# Patient Record
Sex: FEMALE | Race: WHITE
[De-identification: names, ages, dates, MRNs, and addresses within clinical notes are randomized per-mention and may not be internally consistent; named-entity substitution may affect disease eponyms.]

---

## 2017-03-08 ENCOUNTER — HOSPITAL ENCOUNTER (OUTPATIENT)
Dept: HOSPITAL 47 - RADUSWWP | Age: 47
Discharge: HOME | End: 2017-03-08
Payer: COMMERCIAL

## 2017-03-08 DIAGNOSIS — K76.0: ICD-10-CM

## 2017-03-08 DIAGNOSIS — K76.89: Primary | ICD-10-CM

## 2017-03-08 PROCEDURE — 76700 US EXAM ABDOM COMPLETE: CPT

## 2017-03-08 NOTE — US
EXAMINATION TYPE: US abdomen complete

 

DATE OF EXAM: 3/8/2017 1:51 PM

 

COMPARISON: US in PACS

 

CLINICAL HISTORY: Gen Abd Pain R10.84. Pt states ABD pain

 

EXAM MEASUREMENTS:

 

Liver Length:  18.5 cm   

Gallbladder Wall:  0.3 cm   

CBD:  0.6 cm

Spleen:  11.8 cm   

Right Kidney:  10.0 x 3.8 x 4.6 cm 

Left Kidney:  9.7 x 4.7 x 4.5 cm   

 

TECHNOLOGIST IMPRESSION:

 

Pancreas:  wnl, tail obscured by overlying bowel gas

Liver:  Enlarged, heterogeneous, difficult to penetrate/ Cyst left medial lobe=2.2 x 2.0 x 2.0 cm, un
changed from previous   

Gallbladder:  Possible small amount of sludge at dependent portion as seen on previous/ Fold at fundu
s

**Evidence for sonographic Fontenot's sign:  No

CBD:  wnl 

Spleen:  wnl   

Right Kidney:  wnl   

Left Kidney:  wnl   

Upper IVC:  wnl  

Abd Aorta:  wnl

 

IMPRESSION: 

1. Small amount of sludge may be present within the gallbladder.

2. Fatty infiltration liver. A cyst is within the left lobe, stable.

## 2017-03-17 ENCOUNTER — HOSPITAL ENCOUNTER (OUTPATIENT)
Dept: HOSPITAL 47 - ORWHC2ENDO | Age: 47
Discharge: HOME | End: 2017-03-17
Payer: COMMERCIAL

## 2017-03-17 VITALS — TEMPERATURE: 98.1 F

## 2017-03-17 VITALS — HEART RATE: 72 BPM | DIASTOLIC BLOOD PRESSURE: 81 MMHG | SYSTOLIC BLOOD PRESSURE: 123 MMHG | RESPIRATION RATE: 18 BRPM

## 2017-03-17 VITALS — BODY MASS INDEX: 30.7 KG/M2

## 2017-03-17 DIAGNOSIS — Z79.899: ICD-10-CM

## 2017-03-17 DIAGNOSIS — F41.9: ICD-10-CM

## 2017-03-17 DIAGNOSIS — Z88.0: ICD-10-CM

## 2017-03-17 DIAGNOSIS — Z88.2: ICD-10-CM

## 2017-03-17 DIAGNOSIS — K29.50: ICD-10-CM

## 2017-03-17 DIAGNOSIS — K21.9: Primary | ICD-10-CM

## 2017-03-17 PROCEDURE — 88342 IMHCHEM/IMCYTCHM 1ST ANTB: CPT

## 2017-03-17 PROCEDURE — 88305 TISSUE EXAM BY PATHOLOGIST: CPT

## 2017-03-17 PROCEDURE — 45380 COLONOSCOPY AND BIOPSY: CPT

## 2017-03-17 PROCEDURE — 43239 EGD BIOPSY SINGLE/MULTIPLE: CPT

## 2017-03-17 NOTE — P.PCN
Date of Procedure: 03/17/17


Procedure(s) Performed: 


Brief history:


Patient is a pleasant 46-year-old white female, scheduled for an elective upper 

endoscopy as well as colonoscopy as a part of evaluation of intermittent 

dysphagia to solids and change in bowel habits.  She also has long-standing 

history of GERD and has been on Prilosec 20 mg daily for several years.  She 

has been having diarrhea with bowel movements anywhere from 10-12 day which are 

loose to watery in consistency but no blood or mucus in the stool.





Procedure performed:


Esophagogastroduodenoscopy with biopsy


Colonoscopy with biopsy





Preoperative diagnosis:


Dysphagia/GERD


Change in bowel habits and chronic diarrhea for months duration





Anesthesia: MAC





Procedure:


After informed consent was obtained from the patient  was brought into the 

endoscopy unit and IV conscious sedation was administered by anesthesia under 

continuous monitoring.  Initially upper endoscopy was done.  The Olympus  

video endoscope was inserted inserted into the mouth and esophagus intubated 

without any difficulty and was gradually advanced into the stomach and duodenum 

and carefully examined.  The bulb and second part of the duodenum appeared 

normal.  Biopsies were done from this area to rule out celiac disease.  The 

scope was then withdrawn into the stomach adequately insufflated with air and 

upon careful examination  the antrum had mild gastritis and biopsies were done 

from this area.  The  body, cardia and fundus appeared normal.  The scope was 

then withdrawn into the esophagus.  The GE junction was located at 40 cm to the 

incisors.  It appeared regular with no erythema erosions or ulcerations.  Rest 

of the esophagus appeared normal.  Patient tolerated the procedure well.





At this time the patient continued to remain sedation.  Initial digital rectal 

examination was normal.  Olympus  video colonoscope was then inserted 

into the rectum and gradually advanced to the cecum without any difficulty.  

Careful examination was performed as the scope was gradually being withdrawn.  

The prep was excellent.  terminal ileum was intubated and 20 cm visualized and 

appeared normal. The cecum, ascending colon, transverse colon, descending colon

, sigmoid colon and rectum appeared normal.  random biopsies were done from 

ascending and descending colon to rule out microscopic/collagenous colitis. 

Retroflexion was performed in the rectum and no lesions were noted.  Patient 

tolerated the procedure well.





Impression:


1.  Upper endoscopy revealed mild antral gastritis but no evidence of 

esophagitis or peptic ulcer disease 


2.  Colonoscopy revealed normal-appearing colon from rectum to cecum with no 

evidence of colitis or colorectal neoplasia.  








Recommendations:


Findings of this examination were discussed with the patient as well as[her 

family.  She was advised to follow with the biopsy results.  She can have a 

repeat colonoscopy in 10 years.

## 2017-11-08 ENCOUNTER — HOSPITAL ENCOUNTER (OUTPATIENT)
Dept: HOSPITAL 47 - RADMAMWWP | Age: 47
Discharge: HOME | End: 2017-11-08
Payer: COMMERCIAL

## 2017-11-08 DIAGNOSIS — Z12.31: Primary | ICD-10-CM

## 2017-11-08 DIAGNOSIS — Z78.0: ICD-10-CM

## 2017-11-08 PROCEDURE — 77080 DXA BONE DENSITY AXIAL: CPT

## 2017-11-08 NOTE — BD
EXAMINATION TYPE: MG DEXA axial skeleton.  

 

DATE OF EXAM: 11/8/2017

 

COMPARISON: NONE

 

CLINICAL HISTORY: Postmenopausal female. Screening.

 

Height:  5'5

Weight:  200

 

FRAX RISK QUESTIONS:

Alcohol (3 or more units per day):  no

Family History (Parent hip fracture):  no

Glucocorticoids (More than 3mos):  no

           (Ex: prednisone, prednisolone, methylprednisolone, dexamethasone, and hydrocortisone).    
     

History of Fracture in Adulthood: no

Secondary Osteoporosis:

  1.  Type 1 Diabetes: no

  2.  Hyperthyroidism: no

  3.  Menopause before 45: yes

  4.  Malnutrition: no

  5.  Chronic liver disease: no

Rheumatoid Arthritis: no

Current Tobacco Use: no

 

RISK FACTORS 

HISTORY OF: 

Family History of Osteoporosis:

Active:

Diet low in dairy products/other sources of calcium:

Postmenopausal woman:

 

MEDICATIONS: 

Additional Medications: anxiety,  

 

 

Additional History:

 

 

EXAM MEASUREMENTS: 

Bone mineral densitometry was performed using the [employer] System.

Bone mineral density as measured about the Lumbar spine is:  

----- L1-L4(G/cm2): 1.343

T Score Values are as follows:

----- L2: 1.1

----- L3: 1.6

----- L4: 1.4

----- L1-L4:1.4

 

Bone mineral density about the R hip (g/cm2): 1.137

Bone mineral density about the L hip (g/cm2): 1.149

T Score values are as follows:

-----R Neck: 0.7

-----L Neck: 0.8

-----R Total: 0.9

-----L Total: 1.0

 

IMPRESSION:

Normal (Values between +1 and -1 indicate normal bone mass).  Consider repeating this study in 5 year
s or sooner if there is some new clinical indication.

 

 

 

 

 

NOTE:  T-SCORE=SD OF THE YOUNG ADULT MEAN.

## 2017-11-09 NOTE — MM
Reason for exam: screening  (asymptomatic).

Last mammogram was performed 2 years and 4 months ago.



History:

Patient is postmenopausal and is nulliparous.

Family history of breast cancer in grandmother.

Took hormonal contraceptives for 10 years.



Physical Findings:

A clinical breast exam by your physician is recommended on an annual basis and 

results should be correlated with mammographic findings.



MG Screening Mammo w CAD

Bilateral CC and MLO view(s) were taken.

Prior study comparison: July 9, 2015, bilateral MG screening mammo w CAD.  June 17, 2014, bilateral MG screening mammo w CAD.

There are scattered fibroglandular densities.  Developing asymmetry anterior upper

outer quadrant in the left breast. Focal asymmetry left posterior middle central 

aspect on MLO view.

This finding is changed when compared with previous exams.





ASSESSMENT: Incomplete: need additional imaging evaluation, BI-RAD 0



RECOMMENDATION:

Special view mammogram of the left breast.



If lesion persists on supplemental views, image directed ultrasound is 

recommended.



[facility] will attempt to contact patient to return for supplemental 

views and ultrasound if indicated.

## 2017-11-14 ENCOUNTER — HOSPITAL ENCOUNTER (OUTPATIENT)
Dept: HOSPITAL 47 - RADMAMWWP | Age: 47
Discharge: HOME | End: 2017-11-14
Payer: COMMERCIAL

## 2017-11-14 DIAGNOSIS — R92.8: Primary | ICD-10-CM

## 2017-11-14 DIAGNOSIS — Z80.3: ICD-10-CM

## 2017-11-14 PROCEDURE — 76642 ULTRASOUND BREAST LIMITED: CPT

## 2017-11-14 NOTE — USB
Reason for exam: additional evaluation requested from abnormal screening.



History:

Patient is postmenopausal and is nulliparous.

Family history of breast cancer in grandmother.

Took hormonal contraceptives for 10 years.



US Breast Workup Limited LT

Left breast ultrasound demonstrates a 0.4 x 0.3 x 0.4cm oval lesion too small to 

characterize at 1 o'clock, a 0.7 x 0.6 x 0.6cm irregular, hypoechoic, vascular 

lesion at 2 o'clock, 6cm from nipple, which biopsy advised, believed to correspond

to mammographic area, a 0.8 x 0.4 x 0.8cm oblong, lobular, cystic lesion at 4 

o'clock and a 0.8 x 0.6 x 0.7cm cluster at 4 o'clock.



These results were verbally communicated with the patient and result sheet given 

to the patient on 11/14/17.





ASSESSMENT: Suspicious, BI-RAD 4



RECOMMENDATION:

Ultrasound core biopsy of the left breast.



Called Dr. Hudson with mammographic findings and has scheduled an appointment for

the patient for 12/7/17 at 10:40 with Dr. Isaac.

Biopsy scheduled for 11/17/17 at 11:30.





PRELIMINARY REPORT CALLED AND FAXED TO DR. ISAAC ON 11/14/17.

## 2017-11-14 NOTE — MM
Reason for exam: additional evaluation requested from abnormal screening.

Last mammogram was performed less than 1 month ago.



History:

Patient is postmenopausal and is nulliparous.

Family history of breast cancer in grandmother.

Took hormonal contraceptives for 10 years.



Physical Findings:

Nurse did not find any significant physical abnormalities on exam.



MG Work Up Mamm w CAD LT

Spot compression CC, spot compression MLO, and ML view(s) were taken of the left 

breast.

Prior study comparison: November 8, 2017, bilateral MG screening mammo w CAD.  

July 9, 2015, bilateral MG screening mammo w CAD.

Area does not completely go away.



These results were verbally communicated with the patient and result sheet given 

to the patient on 11/14/17.





ASSESSMENT: Incomplete: need additional imaging evaluation, BI-RAD 0



RECOMMENDATION:

Ultrasound of the left breast.

## 2017-11-17 ENCOUNTER — HOSPITAL ENCOUNTER (OUTPATIENT)
Dept: HOSPITAL 47 - RADUSWWP | Age: 47
End: 2017-11-17
Payer: COMMERCIAL

## 2017-11-17 VITALS — BODY MASS INDEX: 33.3 KG/M2

## 2017-11-17 VITALS
TEMPERATURE: 98 F | RESPIRATION RATE: 16 BRPM | DIASTOLIC BLOOD PRESSURE: 74 MMHG | HEART RATE: 74 BPM | SYSTOLIC BLOOD PRESSURE: 142 MMHG

## 2017-11-17 DIAGNOSIS — N60.22: ICD-10-CM

## 2017-11-17 DIAGNOSIS — R92.8: ICD-10-CM

## 2017-11-17 DIAGNOSIS — N60.32: Primary | ICD-10-CM

## 2017-11-17 PROCEDURE — 19083 BX BREAST 1ST LESION US IMAG: CPT

## 2017-11-17 PROCEDURE — 88305 TISSUE EXAM BY PATHOLOGIST: CPT

## 2017-11-17 NOTE — USB
ULTRASOUND GUIDED CORE BIOPSY LEFT BREAST BIOPSY:

 

CLINICAL HISTORY: Abnormal ultrasound and mammogram

 

FINDINGS: 

The procedure was explained to the patient.  The risks, complications, benefits and alternatives were
 discussed and any questions were answered.  Informed consent was obtained.  Patient was placed supin
e on the ultrasound table and prepped and draped in the usual sterile fashion.  Utilizing a 14 gauge 
needle, 3 passes were made into the requested lesion.  

 

Patient was stable throughout the procedure.  Pathology is pending.

 

All elements of maximal barrier and sterile technique were utilized. Surgical clip was placed and the
 mammogram demonstrated the area of concern to be in the region of the clip.

 

IMPRESSION: 

1.  Successful ultrasound guided core biopsy left breast. Pathology pending. If the pathology is disc
ordant with the radiology than a stereotactic core biopsy or needle localization would be recommended
.

## 2017-11-17 NOTE — MM
Reason for exam: additional evaluation requested from abnormal screening.

Last mammogram was performed less than 1 month ago.



History:

Patient is postmenopausal and is nulliparous.

Family history of breast cancer in grandmother.

Took hormonal contraceptives for 10 years.



MG Diagnostic Mammo LT Wo CAD

CC and ML view(s) were taken of the left breast.

Prior study comparison: November 14, 2017, left breast MG work up mamm w CAD LT.  

November 8, 2017, bilateral MG screening mammo w CAD.



ASSESSMENT: Post procedure mammogram for marker placement



RECOMMENDATION:

Ultrasound of the left breast in 6 months.

PENDING PATHOLOGY RESULTS.

## 2018-06-08 ENCOUNTER — HOSPITAL ENCOUNTER (OUTPATIENT)
Dept: HOSPITAL 47 - RADMAMWWP | Age: 48
Discharge: HOME | End: 2018-06-08
Payer: COMMERCIAL

## 2018-06-08 DIAGNOSIS — R92.8: Primary | ICD-10-CM

## 2018-06-08 PROCEDURE — 77065 DX MAMMO INCL CAD UNI: CPT

## 2018-06-11 NOTE — MM
Reason for exam: follow-up at short interval from prior study.

Last mammogram was performed 7 months ago.



History:

Patient is postmenopausal and is nulliparous.

Family history of breast cancer in grandmother.

Benign US breast needle core LT of the left breast, November 17, 2017.

Took hormonal contraceptives for 10 years.



Physical Findings:

Nurse Summary: 1cm nodule in the left breast at 12 o'clock and 3 o'clock (nurse 

dw).



MG Diagnostic Mammo LT w CAD

CC and MLO view(s) were taken of the left breast.

Prior study comparison: November 17, 2017, left breast MG diagnostic mammo LT wo 

CAD.  November 14, 2017, left breast MG work up mamm w CAD LT.

The breast tissue is heterogeneously dense. This may lower the sensitivity of 

mammography.  There are benign appearing round, oval, circumscribed masses 

corresponding to sonographic cysts. Left biopsy marker noted.



These results were verbally communicated with the patient and result sheet given 

to the patient on 6/8/18.





ASSESSMENT: Benign, BI-RAD 2



RECOMMENDATION:

Return to routine screening mammogram schedule for both breasts.

Back on schedule for November 2018.

## 2018-06-11 NOTE — USB
Reason for exam: follow-up at short interval from prior study.



History:

Patient is postmenopausal and is nulliparous.

Family history of breast cancer in grandmother.

Benign US breast needle core LT of the left breast, November 17, 2017.

Took hormonal contraceptives for 10 years.



US Breast LT

Left complete breast ultrasound includes all four quadrants, the retroareolar 

region and axilla. Finding demonstrates a 0.3 x 0.4 x 0.3cm hypoechoic lesion at 1

o'clock, likely complicated cyst, increased through transmission, 4 x 3 x 4mm on 

prior, a 0.4 x 0.4 x 0.3cm cystic lesion at 3 o'clock, 4 x 3cm on prior, a 1.0 x 

1.1 x 0.5cm oval, vascular lesion at 3 o'clock and a 0.7 x 0.4 x 0.3cm cystic, 

benign lesion at 5 o'clock.



These results were verbally communicated with the patient and result sheet given 

to the patient on 6/8/18.





ASSESSMENT: Benign, BI-RAD 2



RECOMMENDATION:

Return to routine screening mammogram schedule for both breasts.

Back on schedule for November 2018.

## 2019-04-01 ENCOUNTER — HOSPITAL ENCOUNTER (OUTPATIENT)
Dept: HOSPITAL 47 - RADMAMWWP | Age: 49
Discharge: HOME | End: 2019-04-01
Attending: INTERNAL MEDICINE
Payer: COMMERCIAL

## 2019-04-01 DIAGNOSIS — Z12.31: Primary | ICD-10-CM

## 2019-04-01 PROCEDURE — 77063 BREAST TOMOSYNTHESIS BI: CPT

## 2019-04-01 PROCEDURE — 77067 SCR MAMMO BI INCL CAD: CPT

## 2019-04-03 NOTE — MM
Reason for exam: screening  (asymptomatic).

Last mammogram was performed 10 months ago.



History:

Patient is postmenopausal and is nulliparous.

Family history of breast cancer in grandmother.

Benign US breast needle core LT of the left breast, November 17, 2017.

Took hormonal contraceptives for 10 years.



Physical Findings:

A clinical breast exam by your physician is recommended on an annual basis and 

results should be correlated with mammographic findings.



MG 3D Screening Mammo W/Cad

Bilateral CC and MLO view(s) were taken.

Prior study comparison: June 8, 2018, left breast MG diagnostic mammo LT w CAD.  

November 17, 2017, left breast MG diagnostic mammo LT wo CAD.

The breast tissue is heterogeneously dense. This may lower the sensitivity of 

mammography.  Previous mammotome biopsy in the left breast. There is chronic 

nodularity in the left breast laterally.  No significant changes when compared 

with prior studies.





ASSESSMENT: Benign, BI-RAD 2



RECOMMENDATION:

Routine screening mammogram of both breasts in 1 year.

## 2019-07-05 ENCOUNTER — HOSPITAL ENCOUNTER (OUTPATIENT)
Dept: HOSPITAL 47 - RADUSWWP | Age: 49
End: 2019-07-05
Attending: INTERNAL MEDICINE
Payer: COMMERCIAL

## 2019-07-05 DIAGNOSIS — R94.4: Primary | ICD-10-CM

## 2019-07-05 PROCEDURE — 76770 US EXAM ABDO BACK WALL COMP: CPT

## 2019-07-05 NOTE — US
EXAMINATION TYPE: US kidneys/renal and bladder

 

DATE OF EXAM: 7/5/2019

 

COMPARISON: US abd complete

 

CLINICAL HISTORY: Abn kidney function R94.4.

 

EXAM MEASUREMENTS:

 

Right Kidney:  10.2 x 3.5 x 3.9 cm

Left Kidney: 9.9 x 5.5 x 6.3 cm

 

 

Right kidney is hard to see due to body habitus and bowel gas. 

 

Right Kidney: No hydronephrosis or masses seen  

Left Kidney: No hydronephrosis or masses seen  

Bladder: wnl

**Bilateral Jets seen: Yes

 

 

There is no evidence for hydronephrosis at this point in time.  No nephrolithiasis is seen.  No nereida
s are identified.  The urinary bladder is anechoic.  Bilateral ureteral jets are seen.

 

 

 

IMPRESSION:

No definite abnormality seen at this time.

## 2020-10-22 ENCOUNTER — HOSPITAL ENCOUNTER (OUTPATIENT)
Dept: HOSPITAL 47 - RADMAMWWP | Age: 50
Discharge: HOME | End: 2020-10-22
Attending: INTERNAL MEDICINE
Payer: COMMERCIAL

## 2020-10-22 DIAGNOSIS — Z12.31: Primary | ICD-10-CM

## 2020-10-22 PROCEDURE — 77067 SCR MAMMO BI INCL CAD: CPT

## 2020-10-22 PROCEDURE — 77063 BREAST TOMOSYNTHESIS BI: CPT

## 2020-10-26 NOTE — MM
Reason for exam: screening  (asymptomatic).

Last mammogram was performed 1 year and 7 months ago.



History:

Patient is postmenopausal and is nulliparous.

Family history of breast cancer in grandmother.

Benign US breast needle core LT of the left breast, November 17, 2017.

Took hormonal contraceptives for 10 years.



Physical Findings:

A clinical breast exam by your physician is recommended on an annual basis and 

results should be correlated with mammographic findings.



MG 3D Screening Mammo W/Cad

Bilateral CC and MLO view(s) were taken.

Prior study comparison: April 1, 2019, bilateral MG 3d screening mammo w/cad.  

June 8, 2018, left breast MG diagnostic mammo LT w CAD.

There are scattered fibroglandular densities.  No significant changes when 

compared with prior studies.





ASSESSMENT: Benign, BI-RAD 2



RECOMMENDATION:

Routine screening mammogram of both breasts in 1 year.

## 2021-07-12 ENCOUNTER — HOSPITAL ENCOUNTER (OUTPATIENT)
Dept: HOSPITAL 47 - LABWHC1 | Age: 51
Discharge: HOME | End: 2021-07-12
Payer: COMMERCIAL

## 2021-07-12 DIAGNOSIS — L43.9: Primary | ICD-10-CM

## 2021-07-12 DIAGNOSIS — R53.83: ICD-10-CM

## 2021-07-12 PROCEDURE — 80074 ACUTE HEPATITIS PANEL: CPT

## 2021-07-12 PROCEDURE — 36415 COLL VENOUS BLD VENIPUNCTURE: CPT

## 2022-06-01 ENCOUNTER — HOSPITAL ENCOUNTER (OUTPATIENT)
Dept: HOSPITAL 47 - RADMAMWWP | Age: 52
Discharge: HOME | End: 2022-06-01
Attending: INTERNAL MEDICINE
Payer: COMMERCIAL

## 2022-06-01 DIAGNOSIS — Z78.0: ICD-10-CM

## 2022-06-01 DIAGNOSIS — Z80.3: ICD-10-CM

## 2022-06-01 DIAGNOSIS — Z12.31: Primary | ICD-10-CM

## 2022-06-01 PROCEDURE — 77067 SCR MAMMO BI INCL CAD: CPT

## 2022-06-01 PROCEDURE — 77063 BREAST TOMOSYNTHESIS BI: CPT

## 2022-06-01 PROCEDURE — 77080 DXA BONE DENSITY AXIAL: CPT

## 2022-06-02 NOTE — MM
Reason for Exam: Screening  (asymptomatic). 

Last mammogram was performed 1 year(s) and 8 month(s) ago. 





Patient History: 

Menarche at age 14. Patient has no children. Postmenopausal. Patient used Hormonal Contraceptives

for 10 years. 11/17/2017, Benign Core Biopsy on the left side.

Maternal grandmother had breast cancer. 





Risk Values: 

Yesenia 5 year model risk: 1.3%.

NCI Lifetime model risk: 11.6%.





Prior Study Comparison: 

6/8/2018 Left Diagnostic Mammogram, [facility]. 4/1/2019 Bilateral Screening Mammogram, [facility]. 10/22/2020

Bilateral Screening Mammogram, [facility]. 





Tissue Density: 

There are scattered fibroglandular densities.





Findings: 

Analyzed By CAD. 

Microclip upper outer quadrant left breast from prior biopsy.



There is nodularity in the central and lateral aspect of the left breast which appears more defined.

However, evaluation of the previous 3-D images suggest that this nodularity was present previously

as well. Precautions. Six-month follow-up is recommended.



Otherwise, no significant change. 





Overall Assessment: Probably benign, BI-RAD 3





Management: 

Diagnostic Mammogram of the left breast in 6 months.

Six-month follow-up diagnostic left breast mammogram to reassess nodularity which is better seen on

previous 3-D images.



Patient should continue monthly self breast exams.



This exam should not preclude additional follow-up of suspicious palpable abnormalities.



Electronically signed and approved by: Rishabh Luna M.D. Radiologist

## 2022-06-02 NOTE — BD
EXAMINATION TYPE: Axial Bone Density

 

DATE OF EXAM: 6/1/2022

 

COMPARISON: 11/08/2017

 

CLINICAL HISTORY: 52 years year old Female.  ICD-10 CODE: M81.0 Osteoporosis

 

Height:  64 IN

Weight:  215 LBS

 

FRAX RISK QUESTIONS:

Family History (Parent hip fracture):  YES MOTHER

Secondary Osteoporosis:

    3.  Menopause before 45: YES AGE 40

  

RISK FACTORS 

HISTORY OF: 

Family History of Osteoporosis: YES MOTHER

Active: YES

Postmenopausal woman: AGE 40

 

MEDICATIONS: 

Additional Medications: VIT D, PLAQUINAL, GOUT MED, FOLIC ACID,DOXYCYCLINE 

 

 

 

EXAM MEASUREMENTS: 

Bone mineral densitometry was performed using the [employer] System.

Bone mineral density as measured about the Lumbar spine is:  

----- L1-L4(G/cm2): 1.393

T Score Values are as follows:

----- L1: 1.4

----- L2: 1.6

----- L3: 2.1

----- L4: 1.8

----- L1-L4: 1.8

Bone mineral density has: Increased 4.0% since study of: 11/08/2017

 

Bone mineral density about the R hip (g/cm2): 1.177

Bone mineral density about the L hip (g/cm2): 1.180

T Score values are as follows:

-----R Neck: 1.0

-----L Neck: 1.0

-----R Total: 1.2

-----L Total: 1.4

Bone mineral density has: Increased 4.4% since study of: 11/08/2017

 

 

FRAX%s: The graph provided illustrates a 6.2 chance for a major osteoporotic fx and a 0.0 chance for 
the hips probability for fx in 10 years time.

 

IMPRESSION:

Normal (Values between +1 and -1 indicate normal bone mass).  Consider repeating this study in 5 year
s or sooner if there is some new clinical indication.

 

NOTE:  T-SCORE=SD OF THE YOUNG ADULT MEAN.

## 2022-12-07 ENCOUNTER — HOSPITAL ENCOUNTER (OUTPATIENT)
Dept: HOSPITAL 47 - RADMAMWWP | Age: 52
Discharge: HOME | End: 2022-12-07
Attending: OBSTETRICS & GYNECOLOGY
Payer: COMMERCIAL

## 2022-12-07 DIAGNOSIS — Z80.3: ICD-10-CM

## 2022-12-07 DIAGNOSIS — Z98.890: ICD-10-CM

## 2022-12-07 DIAGNOSIS — Z78.0: ICD-10-CM

## 2022-12-07 DIAGNOSIS — R92.8: Primary | ICD-10-CM

## 2022-12-07 PROCEDURE — 77061 BREAST TOMOSYNTHESIS UNI: CPT

## 2022-12-07 PROCEDURE — 77065 DX MAMMO INCL CAD UNI: CPT

## 2022-12-07 NOTE — MM
Reason for Exam: Follow-up at short interval from prior study. 

Last screening mammogram was performed 6 month(s) ago.





Patient History: 

Menarche at age 14. Patient has no children. Postmenopausal. Patient used Hormonal Contraceptives

for 10 years. 11/17/2017, Benign Core Biopsy on the left side.

Maternal grandmother had breast cancer, age 70. 





Risk Values: 

Yesenia 5 year model risk: 1.3%.

NCI Lifetime model risk: 11.6%.





Prior Study Comparison: 

4/1/2019 Bilateral Screening Mammogram, [facility]. 10/22/2020 Bilateral Screening Mammogram, [facility]. 6/1/2022

Bilateral MG 3D screening mammo w/cad, [facility]. 





Tissue Density: 

Left: The breast tissue is heterogeneously dense. This may lower the sensitivity of mammography.





Findings: 

Analyzed By CAD. 

Stable appearance of left breast tissue densities. No new suspicious masses or secretions or

distortions. Left breast biopsy clip. 





Overall Assessment: Benign, BI-RAD 2





Management: 

Screening Mammogram of both breasts in 1 year.

A clinical breast exam by your physician is recommended on an annual basis and results should be

correlated with mammographic findings.  This exam should not preclude additional follow-up of

suspicious palpable abnormalities.  Results were given to the patient verbally at the time of exam.



Electronically signed and approved by: Emerson Bailey DO

## 2023-07-14 ENCOUNTER — HOSPITAL ENCOUNTER (OUTPATIENT)
Dept: HOSPITAL 47 - RADMAMWWP | Age: 53
Discharge: HOME | End: 2023-07-14
Attending: INTERNAL MEDICINE
Payer: COMMERCIAL

## 2023-07-14 DIAGNOSIS — Z80.3: ICD-10-CM

## 2023-07-14 DIAGNOSIS — Z12.31: Primary | ICD-10-CM

## 2023-07-14 DIAGNOSIS — Z78.0: ICD-10-CM

## 2023-07-14 PROCEDURE — 77063 BREAST TOMOSYNTHESIS BI: CPT

## 2023-07-14 PROCEDURE — 77067 SCR MAMMO BI INCL CAD: CPT

## 2023-07-17 NOTE — MM
Reason for Exam: Screening  (asymptomatic). 

Last mammogram was performed 1 year(s) and 1 month(s) ago. 





Patient History: 

Menarche at age 14. Patient has no children. Postmenopausal. Patient used Hormonal Contraceptives

for 10 years. 11/17/2017, Benign Core Biopsy on the left side.

Maternal grandmother had breast cancer, age 70. 





Risk Values: 

Yesenia 5 year model risk: 1.3%.

NCI Lifetime model risk: 11.4%.





Prior Study Comparison: 

10/22/2020 Bilateral Screening Mammogram, [facility]. 6/1/2022 Bilateral MG 3D screening mammo w/cad, [facility].

12/7/2022 Left MG 3D diag mammo w/cad , [facility]. 





Tissue Density: 

The breast tissue is heterogeneously dense. This may lower the sensitivity of mammography.





Findings: 

Analyzed By CAD. 

Left breast biopsy clip.

There is no suspicious group of microcalcifications or new suspicious mass in either breast. 





Overall Assessment: Negative, BI-RAD 1





Management: 

Screening Mammogram of both breasts in 1 year.

[facility] will attempt to contact patient to return for supplemental views and

ultrasound if indicated.



Patient should continue monthly self-breast exams.  A clinical breast exam by your physician is

recommended on an annual basis.

This exam should not preclude additional follow-up of suspicious palpable abnormalities.



Note on Yesenia scores and lifetime risk:

1. A Yesenia score greater than 3% is considered moderate risk. If this is the case, consider

specialist referral to assess eligibility for a risk reducing agent.

2. If overall lifetime risk for the development of breast cancer is 20% or higher, the patient may

qualify for future screening with alternating mammogram and breast MRI.



Electronically signed and approved by: Emerson Bailey DO

## 2024-10-31 ENCOUNTER — HOSPITAL ENCOUNTER (OUTPATIENT)
Dept: HOSPITAL 47 - RADMAMWWP | Age: 54
Discharge: HOME | End: 2024-10-31
Attending: INTERNAL MEDICINE
Payer: COMMERCIAL

## 2024-10-31 PROCEDURE — 77063 BREAST TOMOSYNTHESIS BI: CPT

## 2024-10-31 PROCEDURE — 77067 SCR MAMMO BI INCL CAD: CPT

## 2024-10-31 NOTE — MM
Reason for Exam: Screening  (asymptomatic). 

Last mammogram was performed 1 year(s) and 3 month(s) ago. 





Patient History: 

Menarche at age 14. Patient has no children. Postmenopausal. Patient used Hormonal Contraceptives

for 10 years. 11/17/2017, Benign Core Biopsy on the left side.

Maternal grandmother had breast cancer, age 70. 





Risk Values: 

Yesenia 5 year model risk: 1.4%.

NCI Lifetime model risk: 11.2%.





Prior Study Comparison: 

6/1/2022 Bilateral MG 3D screening mammo w/cad, [facility]. 12/7/2022 Left MG 3D diag mammo w/cad , [facility].

7/14/2023 Bilateral MG 3D screening mammo w/cad, [facility]. 





Tissue Density: 

The breasts are heterogeneously dense, which may obscure small masses.





Findings: 

Analyzed By CAD. 

Left breast biopsy clip.



Right breast: There is no suspicious group of microcalcifications or new suspicious mass.



Left breast: There is no suspicious group of microcalcifications or new suspicious mass. 





Overall Assessment: Benign, BI-RAD 2





Management: 

Screening Mammogram of both breasts in 1 year.

[facility] will attempt to contact patient to return for supplemental views and

ultrasound if indicated.



Patient should continue monthly self-breast exams.  A clinical breast exam by your physician is

recommended on an annual basis.

This exam should not preclude additional follow-up of suspicious palpable abnormalities.



Note on Yesenia scores and lifetime risk:

1. A Yesenia score greater than 3% is considered moderate risk. If this is the case, consider

specialist referral to assess eligibility for a risk reducing agent.

2. If overall lifetime risk for the development of breast cancer is 20% or higher, the patient may

qualify for future screening with alternating mammogram and breast MRI.



[facility], Workstation: [id number], 10/31/2024 11:53 AM.



Electronically signed and approved by: Emerson Bailey DO

## 2025-02-26 ENCOUNTER — HOSPITAL ENCOUNTER (OUTPATIENT)
Dept: HOSPITAL 47 - RADUSWWP | Age: 55
Discharge: HOME | End: 2025-02-26
Attending: INTERNAL MEDICINE
Payer: COMMERCIAL

## 2025-02-26 DIAGNOSIS — R59.0: ICD-10-CM

## 2025-02-26 DIAGNOSIS — R51.9: Primary | ICD-10-CM

## 2025-02-26 DIAGNOSIS — R31.9: ICD-10-CM

## 2025-02-26 PROCEDURE — 76770 US EXAM ABDO BACK WALL COMP: CPT

## 2025-02-26 NOTE — US
EXAMINATION TYPE: US kidneys/renal and bladder

 

DATE OF EXAM: 2/26/2025

 

COMPARISON: NONE

 

CLINICAL INDICATION: Female, 54 years old with history of R319 HEMATURIA; Hematuria

 

TECHNIQUE: Grayscale imaging of the bilateral kidneys and urinary bladder:

 

FINDINGS:

 

EXAM MEASUREMENTS:

 

Right Kidney:  10.7 x 3.6 x 3.6 cm

Left Kidney: 9.4 x 4.4 x 4.4 cm

 

Urinary bladder is sonolucent. The posterior wall is normal.

 

Right Kidney: no evidence of hydronephrosis  

Left Kidney: no evidence of hydronephrosis  

Bladder: appears wnl 

**Bilateral Jets seen: yes

 

IMPRESSION:

 

1. Normal renal ultrasound

 

 

 

[facility], Workstation: [id number], 2/26/2025 11:37 PM

## 2025-02-26 NOTE — US
EXAMINATION TYPE: US groin LT

 

DATE OF EXAM: 2/26/2025

 

COMPARISON: NONE

 

CLINICAL INDICATION: Female, 54 years old with history of R591 LYMPHADENOPATHY; Lymph nodes left groi
n

 

TECHNIQUE:

 

FINDINGS:  Multiple lymph nodes left groin, largest = 1.1 x 0.7 x 1.7cm 

 

 

 

 

 

IMPRESSION:  Left inguinal adenopathy without thickened cortex.

 

[facility], Workstation: [id number], 2/26/2025 11:38 PM